# Patient Record
Sex: FEMALE | Race: WHITE
[De-identification: names, ages, dates, MRNs, and addresses within clinical notes are randomized per-mention and may not be internally consistent; named-entity substitution may affect disease eponyms.]

---

## 2018-04-18 ENCOUNTER — HOSPITAL ENCOUNTER (EMERGENCY)
Dept: HOSPITAL 65 - ER | Age: 18
Discharge: ANOTHER HOSPITAL | End: 2018-04-18
Payer: COMMERCIAL

## 2018-04-18 VITALS — DIASTOLIC BLOOD PRESSURE: 55 MMHG | SYSTOLIC BLOOD PRESSURE: 102 MMHG

## 2018-04-18 VITALS — SYSTOLIC BLOOD PRESSURE: 102 MMHG | DIASTOLIC BLOOD PRESSURE: 55 MMHG

## 2018-04-18 VITALS — DIASTOLIC BLOOD PRESSURE: 67 MMHG | SYSTOLIC BLOOD PRESSURE: 127 MMHG

## 2018-04-18 VITALS — BODY MASS INDEX: 28.32 KG/M2 | WEIGHT: 150 LBS | HEIGHT: 61 IN

## 2018-04-18 VITALS — DIASTOLIC BLOOD PRESSURE: 54 MMHG | SYSTOLIC BLOOD PRESSURE: 106 MMHG

## 2018-04-18 DIAGNOSIS — S39.92XA: ICD-10-CM
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
## 2018-04-18 DIAGNOSIS — S39.91XA: Primary | ICD-10-CM
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
## 2018-04-18 DIAGNOSIS — Z88.1: ICD-10-CM
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
## 2018-04-18 DIAGNOSIS — Z90.49: ICD-10-CM

## 2018-04-18 DIAGNOSIS — R51: ICD-10-CM
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
## 2018-04-18 DIAGNOSIS — F17.200: ICD-10-CM

## 2018-04-18 DIAGNOSIS — Z88.8: ICD-10-CM

## 2018-04-18 LAB
ALP INTEST CFR SERPL: 50 U/L (ref 50–136)
ALT SERPL-CCNC: 115 U/L (ref 12–78)
AST SERPL-CCNC: 142 U/L (ref 0–35)
BASOPHILS # BLD AUTO: 0 10^3/UL (ref 0–0.1)
BASOPHILS NFR BLD AUTO: 0.2 % (ref 0–0.2)
CALCIUM SERPL-MCNC: 8.7 MG/DL (ref 8.4–10.5)
CO2 BLDA-SCNC: 25.1 MMOL/L (ref 20–32)
EOSINOPHIL # BLD AUTO: 0.1 10^3/UL (ref 0–0.2)
EOSINOPHIL NFR BLD AUTO: 0.5 % (ref 0–5)
ERYTHROCYTE [DISTWIDTH] IN BLOOD BY AUTOMATED COUNT: 13.3 % (ref 11.5–14.5)
GLUCOSE PRE 100 G GLC PO SERPL-MCNC: 109 MG/DL (ref 70–110)
HGB BLD-MCNC: 13 G/DL (ref 12.4–14.8)
LYMPHOCYTES # BLD AUTO: 1.4 10^3/UL (ref 1.2–5.2)
LYMPHOCYTES NFR BLD AUTO: 12.9 % (ref 24–44)
MCH RBC QN AUTO: 25.8 PG (ref 26–34)
MCHC RBC AUTO-ENTMCNC: 32.6 G/DL (ref 33–37)
MCV RBC AUTO: 79.2 FL (ref 78–100)
MONOCYTES # BLD AUTO: 0.8 10^3/UL (ref 0–0.4)
MONOCYTES NFR BLD AUTO: 7.6 % (ref 5–12)
NEUTROPHILS # BLD AUTO: 8.3 10^3/UL (ref 1.8–8)
NEUTROPHILS NFR BLD AUTO: 78.4 % (ref 41–85)
PLATELET # BLD AUTO: 313 10^3/UL (ref 150–400)
PMV BLD AUTO: 9.9 FL (ref 7.8–11)
WBC # BLD AUTO: 10.6 10^3/UL (ref 4.5–12.5)

## 2018-04-18 PROCEDURE — 85610 PROTHROMBIN TIME: CPT

## 2018-04-18 PROCEDURE — 96374 THER/PROPH/DIAG INJ IV PUSH: CPT

## 2018-04-18 PROCEDURE — 80053 COMPREHEN METABOLIC PANEL: CPT

## 2018-04-18 PROCEDURE — 36415 COLL VENOUS BLD VENIPUNCTURE: CPT

## 2018-04-18 PROCEDURE — 85025 COMPLETE CBC W/AUTO DIFF WBC: CPT

## 2018-04-18 PROCEDURE — 96361 HYDRATE IV INFUSION ADD-ON: CPT

## 2018-04-18 PROCEDURE — 85730 THROMBOPLASTIN TIME PARTIAL: CPT

## 2018-04-18 PROCEDURE — 99284 EMERGENCY DEPT VISIT MOD MDM: CPT

## 2018-04-18 NOTE — NUR
ESHA MARQUEZ MBA ON PHONE WITH [facility] TRANSFER TO SEE IF LIFESTAR IS AVAILABLE
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
